# Patient Record
Sex: MALE | Race: BLACK OR AFRICAN AMERICAN | NOT HISPANIC OR LATINO | Employment: FULL TIME | URBAN - METROPOLITAN AREA
[De-identification: names, ages, dates, MRNs, and addresses within clinical notes are randomized per-mention and may not be internally consistent; named-entity substitution may affect disease eponyms.]

---

## 2022-11-12 ENCOUNTER — PATIENT MESSAGE (OUTPATIENT)
Dept: ADMINISTRATIVE | Facility: OTHER | Age: 27
End: 2022-11-12

## 2023-06-27 ENCOUNTER — PATIENT MESSAGE (OUTPATIENT)
Dept: RESEARCH | Facility: HOSPITAL | Age: 28
End: 2023-06-27

## 2023-07-05 ENCOUNTER — PATIENT MESSAGE (OUTPATIENT)
Dept: RESEARCH | Facility: HOSPITAL | Age: 28
End: 2023-07-05

## 2023-09-07 ENCOUNTER — NURSE TRIAGE (OUTPATIENT)
Dept: ADMINISTRATIVE | Facility: CLINIC | Age: 28
End: 2023-09-07

## 2023-09-08 NOTE — TELEPHONE ENCOUNTER
Had a visit and is wanting to know if he can take muscle relaxant and antidepressant. Advised yes as noted in chart. Call back placed as triage nurse noted in Epic that flexeril was not being taken. Pt advised to contact PCP before taking medication. Pt states that he was just started on Flexeril and chart states medication ordered in July. Pt denies. Advised to contact PCP for medication clarification when office opens at 8 am. Please contact the patient regarding medication regime when office opens. Thanks  Reason for Disposition   [1] Caller has NON-URGENT medicine question about med that PCP prescribed AND [2] triager unable to answer question    Protocols used: Medication Question Call-A-

## 2023-09-11 NOTE — TELEPHONE ENCOUNTER
Called pt and discussed concern about taking wellbutrin and flexeril together. There were no drug interactions noted on website so advised pt that he can take it together if needed. Pt also asked about his allergy med, I informed him that he can take it daily until symptoms improve. T reports that he feels wellbutrin is working already, which is great news. Will see him at his appt on 10/31      Radha Rice MD   Department of Family Medicine

## 2024-07-09 ENCOUNTER — ON-DEMAND VIRTUAL (OUTPATIENT)
Dept: URGENT CARE | Facility: CLINIC | Age: 29
End: 2024-07-09

## 2024-07-09 DIAGNOSIS — H92.09 OTALGIA, UNSPECIFIED LATERALITY: Primary | ICD-10-CM

## 2024-07-09 PROCEDURE — 99212 OFFICE O/P EST SF 10 MIN: CPT | Mod: 95,,, | Performed by: FAMILY MEDICINE

## 2024-07-09 RX ORDER — NEOMYCIN SULFATE, POLYMYXIN B SULFATE AND HYDROCORTISONE 10; 3.5; 1 MG/ML; MG/ML; [USP'U]/ML
3 SUSPENSION/ DROPS AURICULAR (OTIC) 3 TIMES DAILY
Qty: 10 ML | Refills: 0 | Status: SHIPPED | OUTPATIENT
Start: 2024-07-09

## 2024-07-09 NOTE — PROGRESS NOTES
Subjective:      Patient ID: Billy Arevalo is a 28 y.o. male.    Vitals:  vitals were not taken for this visit.     Chief Complaint: Otalgia (Ear pain)      Visit Type: TELE AUDIOVISUAL    Present with the patient at the time of consultation: TELEMED PRESENT WITH PATIENT: None    No past medical history on file.  No past surgical history on file.  Review of patient's allergies indicates:   Allergen Reactions    Sinus allergy Anxiety     Current Outpatient Medications on File Prior to Visit   Medication Sig Dispense Refill    buPROPion (WELLBUTRIN XL) 300 MG 24 hr tablet Take 1 tablet (300 mg total) by mouth once daily. (Patient not taking: Reported on 5/13/2024) 30 tablet 11    fluticasone propionate (FLONASE) 50 mcg/actuation nasal spray 1 spray (50 mcg total) by Each Nostril route once daily. 16 g 3    ibuprofen (ADVIL,MOTRIN) 800 MG tablet Take 1 tablet (800 mg total) by mouth 3 (three) times daily as needed for Pain. (Patient not taking: Reported on 5/13/2024) 60 tablet 1    ketotifen (ALLERGY EYE, KETOTIFEN,) 0.025 % (0.035 %) ophthalmic solution Place 1 drop into both eyes 2 (two) times daily. (Patient not taking: Reported on 5/13/2024) 10 mL 1    levocetirizine (XYZAL) 5 MG tablet Take 1 tablet (5 mg total) by mouth every evening. 30 tablet 11     No current facility-administered medications on file prior to visit.     Family History   Problem Relation Name Age of Onset    Diabetes Mother      Hypertension Mother      Diabetes Sister      Throat cancer Maternal Grandmother      Throat cancer Maternal Grandfather         Medications Ordered                [facility]/pharmacy #[id number] - [place] [address]    Telephone: [phone]   Fax: [phone]   Hours: Not open 24 hours                         E-Prescribed (1 of 1)              neomycin-polymyxin-hydrocortisone (CORTISPORIN) 3.5-10,000-1 mg/mL-unit/mL-% otic suspension    Sig: Place 3 drops into the right ear  3 (three) times daily.       Start: 7/9/24     Quantity: 10 mL Refills: 0                           Ohs Peq Odvv Intake    7/9/2024  5:28 PM CDT - Filed by Patient   What is your current physical address in the event of a medical emergency? [id number]   Are you able to take your vital signs? No   Please attach any relevant images or files          27 yo male with right ear pain of 1.5 weeks.  Says that the pain *just started* probably listening to music and making music with my head phones.  Swollen and feels different than the other ear.  Patient is located in his car for the visit.        Constitution: Negative for fever.   HENT:  Positive for ear pain.         Objective:   The physical exam was conducted virtually.  Physical Exam   Constitutional: He is oriented to person, place, and time. He does not appear ill. No distress.   Pulmonary/Chest: Effort normal. No respiratory distress.   Neurological: He is alert and oriented to person, place, and time.       Assessment:     1. Otalgia, unspecified laterality        Plan:       Otalgia, unspecified laterality  -     neomycin-polymyxin-hydrocortisone (CORTISPORIN) 3.5-10,000-1 mg/mL-unit/mL-% otic suspension; Place 3 drops into the right ear 3 (three) times daily.  Dispense: 10 mL; Refill: 0        Please avoid using q-tips or other instruments in the ear.

## 2024-07-11 ENCOUNTER — ON-DEMAND VIRTUAL (OUTPATIENT)
Dept: URGENT CARE | Facility: CLINIC | Age: 29
End: 2024-07-11

## 2024-07-11 DIAGNOSIS — H92.01 OTALGIA OF RIGHT EAR: Primary | ICD-10-CM

## 2024-07-11 NOTE — PATIENT INSTRUCTIONS
Patient Education       Eustachian Tube Problems Discharge Instructions   About this topic   The eustachian tube is a small passageway that connects your throat to your middle ear. The eustachian tube makes sure you do not have too little or too much pressure in your ear. Sometimes, the opening to this tube gets blocked. This is called eustachian tube dysfunction or ETD. Many things may cause ETD. Sometimes, it is caused by a cold, allergies, or an ear infection. A buildup of mucus can cause ETD as well. Pressure changes like those that happen with riding in an airplane or scuba diving may also cause ETD.  Children are more likely to have ETD. They have a shorter eustachian tube that is not curved like an adult's. It is easier for germs to get inside the ear. It is also easier for fluid to get trapped there. Children have a harder time fighting off infections. Their immune system is not as fully developed as an adult's immune system.     What care is needed at home?   Ask your doctor what you need to do when you go home. Make sure you ask questions if you do not understand what the doctor says. This way you will know what you need to do.  Your doctor may place a tube inside your ear to drain any fluid. You may see yellowish, greenish, or bloody fluid coming out of your ear.  What follow-up care is needed?   Your doctor may ask you to make visits to the office to check on your progress. Be sure to keep these visits.  If you had surgery, there may be a tube inside the ear to drain fluids or just a cut. You may have a follow-up visit after a week to check these for healing.  You doctor will tell you if you need to see a specialist.  What drugs may be needed?   The doctor may order drugs to:  Help with pain and swelling  Fight an infection  Treat stuffiness or congestion  Help with allergies  Make sure to take all the drugs ordered by your doctor even if you are feeling better.  Will physical activity be limited?   Ask  your doctor if you need to avoid doing activities where you have pressure changes in your ears. This may include things like riding elevators or airplanes or going scuba diving. Your doctor may advise you to avoid swimming or putting your head under water.  What problems could happen?   Ear infections  Dizziness  Damaged eardrum  Hearing loss  What can be done to prevent this health problem?   Avoid riding in airplanes and going scuba diving when you have a cough or cold.  Avoid smoking and secondhand smoke.  Use nose decongestants and drugs for allergies. Talk to your doctor about drugs that you can take.  When do I need to call the doctor?   Very bad ear pain  Trouble hearing  Yellow, green, or bloody drainage from the ear  A stiff neck  Problem swallowing  Health problem is not better or you are feeling worse  Helpful tips   These may help open your eustachian tubes:  Chewing gum  Swallowing  Yawning  Taking a deep breath and blowing with your mouth shut and your nose pinched closed  Giving your baby a bottle or a pacifier to suck  Teach Back: Helping You Understand   The Teach Back Method helps you understand the information we are giving you. After you talk with the staff, tell them in your own words what you learned. This helps to make sure the staff has described each thing clearly. It also helps to explain things that may have been confusing. Before going home, make sure you can do these:  I can tell you about my condition.  I can tell you how to care for my ears.  I can tell you what I will do if I have ear pain, drainage from my ear, or problems swallowing.  Where can I learn more?   [employer]org  http://[email or web address].org/familydoctor/en/diseases-conditions/eustachian-tube-dysfunction.html   Last Reviewed Date   2020-03-25  Consumer Information Use and Disclaimer   This information is not specific medical advice and does not replace information you receive from your health care provider. This is only a  brief summary of general information. It does NOT include all information about conditions, illnesses, injuries, tests, procedures, treatments, therapies, discharge instructions or life-style choices that may apply to you. You must talk with your health care provider for complete information about your health and treatment options. This information should not be used to decide whether or not to accept your health care providers advice, instructions or recommendations. Only your health care provider has the knowledge and training to provide advice that is right for you.  Copyright   Copyright © 2021 [employer] and its affiliates and/or licensors. All rights reserved.

## 2024-07-11 NOTE — PROGRESS NOTES
Subjective:      Patient ID: Billy Arevalo is a 28 y.o. male.    Vitals:  vitals were not taken for this visit.     Chief Complaint: right ear pain      Visit Type: TELE AUDIOVISUAL    Present with the patient at the time of consultation: TELEMED PRESENT WITH PATIENT: None, at home    History reviewed. No pertinent past medical history.  History reviewed. No pertinent surgical history.  Review of patient's allergies indicates:   Allergen Reactions    Sinus allergy Anxiety     Current Outpatient Medications on File Prior to Visit   Medication Sig Dispense Refill    fluticasone propionate (FLONASE) 50 mcg/actuation nasal spray 1 spray (50 mcg total) by Each Nostril route once daily. 16 g 3    ibuprofen (ADVIL,MOTRIN) 800 MG tablet Take 1 tablet (800 mg total) by mouth 3 (three) times daily as needed for Pain. (Patient not taking: Reported on 5/13/2024) 60 tablet 1    levocetirizine (XYZAL) 5 MG tablet Take 1 tablet (5 mg total) by mouth every evening. 30 tablet 11    neomycin-polymyxin-hydrocortisone (CORTISPORIN) 3.5-10,000-1 mg/mL-unit/mL-% otic suspension Place 3 drops into the right ear 3 (three) times daily. 10 mL 0    [DISCONTINUED] buPROPion (WELLBUTRIN XL) 300 MG 24 hr tablet Take 1 tablet (300 mg total) by mouth once daily. (Patient not taking: Reported on 5/13/2024) 30 tablet 11    [DISCONTINUED] ketotifen (ALLERGY EYE, KETOTIFEN,) 0.025 % (0.035 %) ophthalmic solution Place 1 drop into both eyes 2 (two) times daily. (Patient not taking: Reported on 5/13/2024) 10 mL 1     No current facility-administered medications on file prior to visit.     Family History   Problem Relation Name Age of Onset    Diabetes Mother      Hypertension Mother      Diabetes Sister      Throat cancer Maternal Grandmother      Throat cancer Maternal Grandfather             Ohs Peq Odvv Intake    7/11/2024  8:49 AM CDT - Filed by Patient   What is your current physical address in the event of a medical emergency? [address]   Are you able to take your vital signs? No   Please attach any relevant images or files          Seen 7/9 for the same. Given ear drops. Symptoms persist and worsening.        HENT:  Positive for ear pain.         Objective:   The physical exam was conducted virtually.  Physical Exam   Constitutional: He is oriented to person, place, and time. He does not appear ill. No distress.   HENT:   Head: Normocephalic and atraumatic.   Nose: Nose normal.   Eyes: Extraocular movement intact   Pulmonary/Chest: Effort normal.   Abdominal: Normal appearance.   Musculoskeletal: Normal range of motion.         General: Normal range of motion.   Neurological: no focal deficit. He is alert and oriented to person, place, and time.   Psychiatric: His behavior is normal. Mood normal.   Vitals reviewed.      Assessment:     1. Otalgia of right ear        Plan:   Given history and symptoms further in-person evaluation is needed for a diagnosis and treatment plan.    Patient encouraged to monitor symptoms closely and instructed to follow-up for new or worsening symptoms. Further, in-person, evaluation is necessary for continued treatment. Please follow up in Urgent Care, ER, or  with your primary care doctor or specialist as needed. Verbally discussed plan. Patient confirms understanding and is in agreement with treatment and plan.     You must understand that you've received a [facility] Care evaluation only and that you may be released before all your medical problems are known or treated. You, the patient, will arrange for follow-up care as instructed.      Otalgia of right ear        Patient Instructions   Patient Education       Eustachian Tube Problems Discharge Instructions   About this topic   The eustachian tube is a small passageway that connects your throat to your middle ear. The eustachian tube makes sure you do not have too little or too much pressure in your ear. Sometimes, the opening to this tube gets blocked. This  is called eustachian tube dysfunction or ETD. Many things may cause ETD. Sometimes, it is caused by a cold, allergies, or an ear infection. A buildup of mucus can cause ETD as well. Pressure changes like those that happen with riding in an airplane or scuba diving may also cause ETD.  Children are more likely to have ETD. They have a shorter eustachian tube that is not curved like an adult's. It is easier for germs to get inside the ear. It is also easier for fluid to get trapped there. Children have a harder time fighting off infections. Their immune system is not as fully developed as an adult's immune system.     What care is needed at home?   Ask your doctor what you need to do when you go home. Make sure you ask questions if you do not understand what the doctor says. This way you will know what you need to do.  Your doctor may place a tube inside your ear to drain any fluid. You may see yellowish, greenish, or bloody fluid coming out of your ear.  What follow-up care is needed?   Your doctor may ask you to make visits to the office to check on your progress. Be sure to keep these visits.  If you had surgery, there may be a tube inside the ear to drain fluids or just a cut. You may have a follow-up visit after a week to check these for healing.  You doctor will tell you if you need to see a specialist.  What drugs may be needed?   The doctor may order drugs to:  Help with pain and swelling  Fight an infection  Treat stuffiness or congestion  Help with allergies  Make sure to take all the drugs ordered by your doctor even if you are feeling better.  Will physical activity be limited?   Ask your doctor if you need to avoid doing activities where you have pressure changes in your ears. This may include things like riding elevators or airplanes or going scuba diving. Your doctor may advise you to avoid swimming or putting your head under water.  What problems could happen?   Ear infections  Dizziness  Damaged  eardrum  Hearing loss  What can be done to prevent this health problem?   Avoid riding in airplanes and going scuba diving when you have a cough or cold.  Avoid smoking and secondhand smoke.  Use nose decongestants and drugs for allergies. Talk to your doctor about drugs that you can take.  When do I need to call the doctor?   Very bad ear pain  Trouble hearing  Yellow, green, or bloody drainage from the ear  A stiff neck  Problem swallowing  Health problem is not better or you are feeling worse  Helpful tips   These may help open your eustachian tubes:  Chewing gum  Swallowing  Yawning  Taking a deep breath and blowing with your mouth shut and your nose pinched closed  Giving your baby a bottle or a pacifier to suck  Teach Back: Helping You Understand   The Teach Back Method helps you understand the information we are giving you. After you talk with the staff, tell them in your own words what you learned. This helps to make sure the staff has described each thing clearly. It also helps to explain things that may have been confusing. Before going home, make sure you can do these:  I can tell you about my condition.  I can tell you how to care for my ears.  I can tell you what I will do if I have ear pain, drainage from my ear, or problems swallowing.  Where can I learn more?   [employer]org  http://familydoctor.org/familydoctor/en/diseases-conditions/eustachian-tube-dysfunction.html   Last Reviewed Date   2020-03-25  Consumer Information Use and Disclaimer   This information is not specific medical advice and does not replace information you receive from your health care provider. This is only a brief summary of general information. It does NOT include all information about conditions, illnesses, injuries, tests, procedures, treatments, therapies, discharge instructions or life-style choices that may apply to you. You must talk with your health care provider for complete information about your health and  treatment options. This information should not be used to decide whether or not to accept your health care providers advice, instructions or recommendations. Only your health care provider has the knowledge and training to provide advice that is right for you.  Copyright   Copyright © 2021 [employer], [employer] and its affiliates and/or licensors. All rights reserved.

## 2025-01-10 ENCOUNTER — ON-DEMAND VIRTUAL (OUTPATIENT)
Dept: URGENT CARE | Facility: CLINIC | Age: 30
End: 2025-01-10
Payer: COMMERCIAL

## 2025-01-10 DIAGNOSIS — J30.1 ALLERGIC RHINITIS DUE TO POLLEN, UNSPECIFIED SEASONALITY: ICD-10-CM

## 2025-01-10 DIAGNOSIS — J32.9 SINUSITIS, UNSPECIFIED CHRONICITY, UNSPECIFIED LOCATION: ICD-10-CM

## 2025-01-10 DIAGNOSIS — J06.9 UPPER RESPIRATORY TRACT INFECTION, UNSPECIFIED TYPE: Primary | ICD-10-CM

## 2025-01-10 RX ORDER — AMOXICILLIN AND CLAVULANATE POTASSIUM 875; 125 MG/1; MG/1
1 TABLET, FILM COATED ORAL EVERY 12 HOURS
Qty: 14 TABLET | Refills: 0 | Status: SHIPPED | OUTPATIENT
Start: 2025-01-10 | End: 2025-01-17

## 2025-01-10 RX ORDER — FLUTICASONE PROPIONATE 50 MCG
1 SPRAY, SUSPENSION (ML) NASAL DAILY
Qty: 16 G | Refills: 3 | Status: SHIPPED | OUTPATIENT
Start: 2025-01-10

## 2025-01-10 RX ORDER — PROMETHAZINE HYDROCHLORIDE AND DEXTROMETHORPHAN HYDROBROMIDE 6.25; 15 MG/5ML; MG/5ML
5 SYRUP ORAL EVERY 6 HOURS PRN
Qty: 118 ML | Refills: 0 | Status: SHIPPED | OUTPATIENT
Start: 2025-01-10

## 2025-01-10 RX ORDER — CETIRIZINE HYDROCHLORIDE 10 MG/1
10 TABLET ORAL DAILY
Qty: 30 TABLET | Refills: 0 | Status: SHIPPED | OUTPATIENT
Start: 2025-01-10 | End: 2026-01-10

## 2025-01-10 NOTE — PROGRESS NOTES
Subjective:      Patient ID: Billy Arevalo is a 29 y.o. male.    Vitals:  vitals were not taken for this visit.     Chief Complaint: Cough      Visit Type: TELE AUDIOVISUAL - This visit was conducted virtually based on  subjective information and limited objective exam    Present with the patient at the time of consultation: TELEMED PRESENT WITH PATIENT: None  LOCATION OF PATIENT [place]  Two patient identifiers used to verify patient- saying out date of birth and full name.       History reviewed. No pertinent past medical history.  History reviewed. No pertinent surgical history.  Review of patient's allergies indicates:   Allergen Reactions    Sinus allergy Anxiety     Current Outpatient Medications on File Prior to Visit   Medication Sig Dispense Refill    ibuprofen (ADVIL,MOTRIN) 800 MG tablet Take 1 tablet (800 mg total) by mouth 3 (three) times daily as needed for Pain. (Patient not taking: Reported on 5/13/2024) 60 tablet 1    levocetirizine (XYZAL) 5 MG tablet Take 1 tablet (5 mg total) by mouth every evening. 30 tablet 11    neomycin-polymyxin-hydrocortisone (CORTISPORIN) 3.5-10,000-1 mg/mL-unit/mL-% otic suspension Place 3 drops into the right ear 3 (three) times daily. 10 mL 0    [DISCONTINUED] fluticasone propionate (FLONASE) 50 mcg/actuation nasal spray 1 spray (50 mcg total) by Each Nostril route once daily. 16 g 3     No current facility-administered medications on file prior to visit.     Family History   Problem Relation Name Age of Onset    Diabetes Mother      Hypertension Mother      Diabetes Sister      Throat cancer Maternal Grandmother      Throat cancer Maternal Grandfather         Medications Ordered                [facility]/pharmacy #[id number] - [place], [place]  [address] [place] [id number]    Telephone: [phone]   Fax: [phone]   Hours: Not open 24 hours                         E-Prescribed (4 of 4)              amoxicillin-clavulanate 875-125mg  (AUGMENTIN) 875-125 mg per tablet    Sig: Take 1 tablet by mouth every 12 (twelve) hours. for 7 days       Start: 1/10/25     Quantity: 14 tablet Refills: 0                         cetirizine (ZYRTEC) 10 MG tablet    Sig: Take 1 tablet (10 mg total) by mouth once daily.       Start: 1/10/25     Quantity: 30 tablet Refills: 0                         fluticasone propionate (FLONASE) 50 mcg/actuation nasal spray    Si spray (50 mcg total) by Each Nostril route once daily.       Start: 1/10/25     Quantity: 16 g Refills: 3                         promethazine-dextromethorphan (PROMETHAZINE-DM) 6.25-15 mg/5 mL Syrp    Sig: Take 5 mLs by mouth every 6 (six) hours as needed.       Start: 1/10/25     Quantity: 118 mL Refills: 0                           Ohs Peq Odvv Intake    1/10/2025  1:10 PM CST - Filed by Patient   What is your current physical address in the event of a medical emergency? [id number]   Are you able to take your vital signs? No   Please attach any relevant images or files    Is your employer contracted with [facility]? No         30yo M with c/o cough productive of mucus with some blood-tinge, runny nose with some blood-tinged drainage, sinus pressure, sore throat, some body aches from coughing, subjective temp that started about 6 days ago. Has taken tylenol sinus tablets, nyquil, halls cough drops, drinking fluids. + sick contact.    Cough  Associated symptoms include a fever, myalgias and a sore throat.       Constitution: Positive for fever.   HENT:  Positive for congestion, nosebleeds and sore throat.    Respiratory:  Positive for cough and sputum production.    Musculoskeletal:  Positive for muscle ache.        Objective:   The physical exam was conducted virtually.    AAO x 3 ; no acute distress noted; appearance normal; mood and behavior normal; thought process normal  Head- normocephalic  Nose- appears normal, no discharge or erythema  Eyes- pupils appear normal in  size, no drainage, no erythema  Ears- normal appearing; no discharge, no erythema  Mouth- appears normal  Oropharynx- no erythema, lesions  Lungs- breathing at a normal rate, no acute distress noted  Heart- no reports of tachycardia, palpitations, chest pain  Abdomen- non distended, non tender reported by patient  Skin- warm and dry, no erythema or edema noted by patient or visualized  Psych- as above; no si/hi      Assessment:     1. Upper respiratory tract infection, unspecified type    2. Allergic rhinitis due to pollen, unspecified seasonality    3. Sinusitis, unspecified chronicity, unspecified location        Plan:     Patient ill but stable-appearing. Given duration of sx, will treat with abx course as prescribed. Conservative measures as below. Pt to follow-up in clinic if sx do not improve.    Thank you for choosing [facility]!    Our goal in the [facility] is to always provide outstanding medical care. You may receive a survey by mail or e-mail in the next week regarding your experience today. We would greatly appreciate you completing and returning the survey. Your feedback provides us with a way to recognize our staff who provide very good care, and it helps us learn how to improve when your experience was below our aspiration of excellence.         We appreciate you trusting us with your medical care. We hope you feel better soon. We will be happy to take care of you for all of your future medical needs.    You must understand that you've received an Urgent Care treatment only and that you may be released before all your medical problems are known or treated. You, the patient, will arrange for follow up care as instructed.    Follow up with your PCP or specialty clinic as directed in the next 1-2 weeks if not improved or as needed.  You can call [phone] to schedule an appointment with the appropriate provider.    If your condition worsens we recommend that  you receive another evaluation in person, with your primary care provider, urgent care or at the emergency room immediately or contact your primary medical clinics after hours call service to discuss your concerns.         Upper respiratory tract infection, unspecified type    Allergic rhinitis due to pollen, unspecified seasonality  -     fluticasone propionate (FLONASE) 50 mcg/actuation nasal spray; 1 spray (50 mcg total) by Each Nostril route once daily.  Dispense: 16 g; Refill: 3    Sinusitis, unspecified chronicity, unspecified location  -     cetirizine (ZYRTEC) 10 MG tablet; Take 1 tablet (10 mg total) by mouth once daily.  Dispense: 30 tablet; Refill: 0  -     amoxicillin-clavulanate 875-125mg (AUGMENTIN) 875-125 mg per tablet; Take 1 tablet by mouth every 12 (twelve) hours. for 7 days  Dispense: 14 tablet; Refill: 0  -     promethazine-dextromethorphan (PROMETHAZINE-DM) 6.25-15 mg/5 mL Syrp; Take 5 mLs by mouth every 6 (six) hours as needed.  Dispense: 118 mL; Refill: 0  -     fluticasone propionate (FLONASE) 50 mcg/actuation nasal spray; 1 spray (50 mcg total) by Each Nostril route once daily.  Dispense: 16 g; Refill: 3